# Patient Record
Sex: FEMALE | Race: WHITE
[De-identification: names, ages, dates, MRNs, and addresses within clinical notes are randomized per-mention and may not be internally consistent; named-entity substitution may affect disease eponyms.]

---

## 2020-09-17 ENCOUNTER — HOSPITAL ENCOUNTER (OUTPATIENT)
Dept: HOSPITAL 47 - RADCTMAIN | Age: 80
Discharge: HOME | End: 2020-09-17
Attending: ORTHOPAEDIC SURGERY
Payer: MEDICARE

## 2020-09-17 DIAGNOSIS — S42.202A: Primary | ICD-10-CM

## 2020-09-17 NOTE — CT
EXAMINATION TYPE: CT shoulder LT wo con

 

DATE OF EXAM: 9/17/2020

 

COMPARISON: None

 

HISTORY: Lt shoulder pain

 

CT DLP: 366 mGycm

Automated exposure control for dose reduction was used.

 

TECHNIQUE: Axial images 3 mm thick sections. Reconstructed images in the coronal and sagittal planes.
 Three-D reconstructed images are performed by the technologist and presented rotating on the compute
r

 

FINDINGS: 

There is an impacted fracture of the humeral neck. The humeral head articulates with the glenoid. The
 acromiohumeral space appears preserved.

 

No additional fractures are evident. Scapula appears intact. Adjacent ribs appear intact. Clavicle is
 intact. Acromioclavicular junction is normal.

 

IMPRESSION: 

IMPACTED FRACTURE NECK OF THE LEFT HUMERUS.

## 2023-09-19 ENCOUNTER — HOSPITAL ENCOUNTER (OUTPATIENT)
Dept: HOSPITAL 47 - RADXRYALE | Age: 83
Discharge: HOME | End: 2023-09-19
Payer: MEDICARE

## 2023-09-19 DIAGNOSIS — M79.89: ICD-10-CM

## 2023-09-19 DIAGNOSIS — M25.571: Primary | ICD-10-CM

## 2023-09-19 NOTE — XR
EXAMINATION TYPE: XR ankle complete RT

 

DATE OF EXAM: 9/19/2023

 

COMPARISON: None

 

HISTORY: Swelling pain

 

TECHNIQUE: 3 view right ankle

 

FINDINGS: No acute fracture or dislocation is evident. The ankle mortise is intact. The soft tissues 
appear normal. Plantar and Achilles tendon calcaneal heel spurs are present.

 

IMPRESSION:

1.  No acute osseous abnormality right ankle.

## 2025-05-16 ENCOUNTER — HOSPITAL ENCOUNTER (OUTPATIENT)
Dept: HOSPITAL 47 - RADXRYALE | Age: 85
Discharge: HOME | End: 2025-05-16
Payer: MEDICARE

## 2025-05-16 DIAGNOSIS — M25.411: ICD-10-CM

## 2025-05-16 DIAGNOSIS — M85.811: ICD-10-CM

## 2025-05-16 DIAGNOSIS — M19.011: Primary | ICD-10-CM
